# Patient Record
Sex: FEMALE | Race: WHITE | NOT HISPANIC OR LATINO | URBAN - METROPOLITAN AREA
[De-identification: names, ages, dates, MRNs, and addresses within clinical notes are randomized per-mention and may not be internally consistent; named-entity substitution may affect disease eponyms.]

---

## 2018-10-23 ENCOUNTER — OFFICE VISIT - HEALTHEAST (OUTPATIENT)
Dept: UROLOGY | Facility: CLINIC | Age: 63
End: 2018-10-23

## 2018-10-23 ENCOUNTER — HOSPITAL ENCOUNTER (OUTPATIENT)
Dept: CT IMAGING | Facility: CLINIC | Age: 63
Discharge: HOME | End: 2018-10-23
Attending: PHYSICIAN ASSISTANT

## 2018-10-23 DIAGNOSIS — N39.0 UTI (URINARY TRACT INFECTION): ICD-10-CM

## 2018-10-23 DIAGNOSIS — N20.0 CALCULUS OF KIDNEY: ICD-10-CM

## 2018-10-23 LAB
ALBUMIN UR-MCNC: ABNORMAL MG/DL
APPEARANCE UR: ABNORMAL
BILIRUB UR QL STRIP: NEGATIVE
COLOR UR AUTO: YELLOW
GLUCOSE UR STRIP-MCNC: NEGATIVE MG/DL
HGB UR QL STRIP: ABNORMAL
KETONES UR STRIP-MCNC: NEGATIVE MG/DL
LEUKOCYTE ESTERASE UR QL STRIP: ABNORMAL
NITRATE UR QL: NEGATIVE
PH UR STRIP: 7 [PH] (ref 5–8)
SP GR UR STRIP: 1.01 (ref 1–1.03)
UROBILINOGEN UR STRIP-ACNC: ABNORMAL

## 2018-10-23 ASSESSMENT — MIFFLIN-ST. JEOR: SCORE: 1431.32

## 2018-10-24 LAB — BACTERIA SPEC CULT: NORMAL

## 2021-06-02 VITALS — HEIGHT: 63 IN | BODY MASS INDEX: 35.44 KG/M2 | WEIGHT: 200 LBS

## 2021-06-21 NOTE — PROGRESS NOTES
Assessment/Plan:        Diagnoses and all orders for this visit:    Calculus of kidney  -     Urinalysis Macroscopic  -     Culture, Urine- Today  -     CT Abdomen Pelvis Without Oral Without IV Contrast; Future  -     oxyCODONE (ROXICODONE) 5 MG immediate release tablet; Take 1-2 tablets (5-10 mg total) by mouth every 4 (four) hours as needed for pain.  Dispense: 20 tablet; Refill: 0  -     ciprofloxacin HCl (CIPRO) 500 MG tablet; Take 1 tablet (500 mg total) by mouth 2 (two) times a day for 14 days.  Dispense: 14 tablet; Refill: 1  -     Patient Stated Goal: Know what to expect after surgery  -     [facility] Education    UTI (urinary tract infection)    Other orders  -     acetaminophen (TYLENOL) 325 MG tablet; Take 650 mg by mouth.  -     amLODIPine (NORVASC) 5 MG tablet; Take 5 mg by mouth.  -     aspirin 81 MG EC tablet; Take 81 mg by mouth.  -     atorvastatin (LIPITOR) 20 MG tablet; Take 20 mg by mouth.  -     calcium citrate-vitamin D (CITRACAL+D) 315-200 mg-unit per tablet; Take 1 tablet by mouth.  -     citalopram (CELEXA) 20 MG tablet; Take 20 mg by mouth.  -     docusate sodium (COLACE) 100 MG capsule; Take 100 mg by mouth.  -     hydroCHLOROthiazide (MICROZIDE) 12.5 mg capsule; Take 12.5 mg by mouth.  -     HYDROcodone-acetaminophen 5-325 mg per tablet; Take 1-2 tablets by mouth.  -     levothyroxine (SYNTHROID, LEVOTHROID) 112 MCG tablet; Take 112 mcg by mouth.  -     losartan (COZAAR) 25 MG tablet; Take 12.5 mg by mouth.  -     magnesium hydroxide (MILK OF MAG) 400 mg/5 mL Susp suspension; Take 30 mL by mouth.  -     ondansetron (ZOFRAN-ODT) 4 MG disintegrating tablet; Place 4 mg under the tongue.      Stone Management Plan  [facility] Stone Management 10/23/2018   Urinary Tract Infection Suspected Infection   Renal Colic Well controlled symptoms   Renal Failure No suspicion of renal failure   Current CT date 10/23/2018   Right sided stones? Yes   R Number of ureteral stones No ureteral stones   R Number of  kidney stones  > 10   R GSD of kidney stones 15 - 20   R Hydronephrosis Mild   R Stone Event New event   Diagnosis date 10/10/2018   Initial location of primary symptomatic stone Renal   Initial GSD of primary symptomatic stone 15   R Current Plan Clear   Clear rationale Associated treated infection   Left sided stones? No   L Stone Event No current event        Subjective:      HPI  Ms. Cassidy Max is a 63 y.o.  female presenting to the [facility] [place] referred by Dr. Sow for right nephrolithiasis with history of UTI.    She is a rapidly recurrent calcium oxalate and calcium phosphate (apatite) stone former who has required stone clearance procedures. She has not previously participated in stone risk evaluation. She has identified modifiable stone risks including:  type II diabetes. She has identified non-modifiable stone risks including:  multiple stones at presentation.     Cassidy had onset of stone disease in late 2016 with clearance of right renal stones, performed by Dr. Sow. Follow up postoperative imaging from 1/23/17 reported decrease in right lower pole renal stone burden with no obstruction. She completed a stone risk July 2017 which was unremarkable. She She was seen by PCP for acute pyelonephritis in early October with CT from 10/10/18 notable for gas filled right renal stones. Urine culture finalized with 3 species of e. Coli, pansensitive. She completed course of cefdinir 3 days ago. She has been referred for consideration of percutaneous right sided stone clearance.    She experiences intermittent, mild right flank pain. She works in housekeeping and notes movement will aggravate the pain. Pertinent negative current symptoms include:  fever, chills, nausea, vomiting, hematuria, urinary frequency and dysuria. She was on Forteo for history of fracture and low bone density, but this was recently discontinued with discovery of stone recurrence.    CT scan from today  is personally reviewed and compared to outside CT scan from 10/10/18. Similar appearance to multiple right renal stones dispersed within pelvis and lower pole. Previous gas collection within dominant stones has improved. There is a 15 mm, 13 mm within right renal pelvis. There are several small stones clustererd within lower pole. Extrarenal pelvis anatomy bilaterally. No left sided stones.     Significant labs from presentation include trace hematuria, mild pyuria, positive nitrite, moderate bacteria and e. coli, 3 species all pansensitive identified on urine culture 10/10/18.    PLAN    64 yo diabetic F with hx of calcium based stone disease and UTI's, previous surgical stone intervention. Active stone formation with multiple, dominant right renal stones with treated emphysematous e. Coli pyelitis. Intermittent, right flank pain currently well tolerated.    Will proceed with percutaneous nephrolithotomy in approximately two weeks. Risks and details were detailed of percutaneous nephrolithotomy including potential issues of urinary or systemic infection, inaccessible stone, incomplete stone clearance, pneumothorax, hydrothorax, hemothorax, blood loss anemia potentially requiring angio-embolization, adjacent organ injury, renal injury, multiple surgeries and stent related symptoms of urgency, frequency and hematuria. Patient verbalized understanding. Patient agrees with plan as discussed. Preoperative evaluation with primary care has been requested.    For symptom control, she was prescribed oxycodone. For documented infection, she was prescribed ciprofloxacin; will follow up pending culture and adjusts antibiotic if needed. Over the counter symptom control medications of ibuprofen, Dramamine and Tylenol were recommended.    Patient also seen and examined by LUIS Hernandez   Review of Systems  A 12 point comprehensive review of systems is negative except for HPI    Past Medical History:   Diagnosis Date      Kidney stone        No past surgical history on file.    Current Outpatient Prescriptions   Medication Sig Dispense Refill     acetaminophen (TYLENOL) 325 MG tablet Take 650 mg by mouth.       amLODIPine (NORVASC) 5 MG tablet Take 5 mg by mouth.       aspirin 81 MG EC tablet Take 81 mg by mouth.       atorvastatin (LIPITOR) 20 MG tablet Take 20 mg by mouth.       calcium citrate-vitamin D (CITRACAL+D) 315-200 mg-unit per tablet Take 1 tablet by mouth.       citalopram (CELEXA) 20 MG tablet Take 20 mg by mouth.       docusate sodium (COLACE) 100 MG capsule Take 100 mg by mouth.       hydroCHLOROthiazide (MICROZIDE) 12.5 mg capsule Take 12.5 mg by mouth.       HYDROcodone-acetaminophen 5-325 mg per tablet Take 1-2 tablets by mouth.       levothyroxine (SYNTHROID, LEVOTHROID) 112 MCG tablet Take 112 mcg by mouth.       losartan (COZAAR) 25 MG tablet Take 12.5 mg by mouth.       magnesium hydroxide (MILK OF MAG) 400 mg/5 mL Susp suspension Take 30 mL by mouth.       ondansetron (ZOFRAN-ODT) 4 MG disintegrating tablet Place 4 mg under the tongue.       No current facility-administered medications for this visit.        Allergies   Allergen Reactions     Lisinopril Cough       Social History     Social History     Marital status:      Spouse name: N/A     Number of children: N/A     Years of education: N/A     Occupational History     Not on file.     Social History Main Topics     Smoking status: Not on file     Smokeless tobacco: Not on file     Alcohol use Not on file     Drug use: Not on file     Sexual activity: Not on file     Other Topics Concern     Not on file     Social History Narrative     No narrative on file       No family history on file.    Objective:      Physical Exam  Vitals:    10/23/18 1103   BP: 112/76   Pulse: 82   Temp: 97.8  F (36.6  C)     General - well developed, well nourished, appropriate for age. Appears no distress at this time   Heart - regular rate and rhythm, no  murmur  Respiratory - normal effort, clear to auscultation, good air entry without adventitious noises  Abdomen - moderately obese soft, non-tender, no hepatosplenomegaly, no masses.   - no flank tenderness, no suprapubic tenderness, kidney and bladder non-palpable  MSK - normal spinal curvature. no spinal tenderness. normal gait. muscular strength intact.  Neurology - cranial nerves II-XII grossly intact, normal sensation, no unsteadiness  Skin - intact, no bruising, no gouty tophi  Psych - oriented to time, place, and person, normal mood and affect.    Labs  Urinalysis POC (Office):  Nitrite, UA   Date Value Ref Range Status   10/23/2018 Negative Negative Final       Lab Urinalysis:  Blood, UA   Date Value Ref Range Status   10/23/2018 Large (!) Negative Final     Nitrite, UA   Date Value Ref Range Status   10/23/2018 Negative Negative Final     Leukocytes, UA   Date Value Ref Range Status   10/23/2018 Moderate (!) Negative Final     pH, UA   Date Value Ref Range Status   10/23/2018 7.0 5.0 - 8.0 Final

## 2022-11-23 ENCOUNTER — TRANSFERRED RECORDS (OUTPATIENT)
Dept: HEALTH INFORMATION MANAGEMENT | Facility: CLINIC | Age: 67
End: 2022-11-23

## 2023-03-15 ENCOUNTER — TELEPHONE (OUTPATIENT)
Dept: RADIATION THERAPY | Facility: OUTPATIENT CENTER | Age: 68
End: 2023-03-15

## 2023-05-11 ENCOUNTER — TELEPHONE (OUTPATIENT)
Dept: RADIATION THERAPY | Facility: OUTPATIENT CENTER | Age: 68
End: 2023-05-11

## 2023-05-12 ENCOUNTER — TELEPHONE (OUTPATIENT)
Dept: RADIATION THERAPY | Facility: OUTPATIENT CENTER | Age: 68
End: 2023-05-12

## 2023-05-22 NOTE — PROGRESS NOTES
Dear Colleagues,  Today Cassidy Max was seen in consultation.  IDENTIFICATION: This is a 67 year old woman with right (UIQ) breast cancer, status post lumpectomy and SLNBx, revealing G3 IDCA, [id number] ER+/MA+/H2N- disease, followed by TC x4 completed on 5/10/23 now referred for adjuvant radiation therapy.   HISTORY OF PRESENT ILLNESS: Cassidy Max was in her usual state of health this past year.  On January 10, 2023 bilateral screening mammogram showed within the right breast 3 o'clock position focal asymmetry.  No suspicious finding in the left breast.  This was followed by right diagnostic mammogram and ultrasound completed on January 19, 2023 that confirmed irregular marginated hypoechoic solid mass which by ultrasound measuring 0.9 cm in greatest dimension.  There is no lymphadenopathy seen.  Same-day right breast ultrasound-guided biopsy at the 3 o'clock position was consistent with grade 2 IDC with no LVSI or DCIS.  It was ER/MA positive HER2/taty negative.  On February 3, 2023 Dr. Pineda took her to the OR and she had a right breast localized lumpectomy and sentinel lymph node biopsy.  Pathology revealed 1.3 cm of grade 3 IDC excised with negative margins with no LVSI or DCIS.  There were 0 out of 2 involved lymph nodes.  This gave her stage of [id number], ER/MA positive HER2/taty negative. Specimen radiograph was completed. Specimen radiograph demonstrates the targeted area including the lesion, the biopsy tissue marker and the radiofrequency localizer tag to be included in the specimen. Her postoperative course has been unenventful.  She was then seen by Dr. Kathi Oconnell who is her Medical Oncologist  Her Oncotype score returned back as 31 and subsequently received systemic therapy; docetaxel/cyclophsphamide + pegfilgrastim support from 3/8/23 to 5/10/23. She is to receive adjuvant anastrazole/endocrine therapy after XRT.    Since her surgery, she has continued to heal well and denies any  significant pain.  She has good ROM.  She denies any other new masses, skin changes, shortness of breath, chest or bony pain, or new neurologic symptoms. She is being seen here today for consideration of postoperative radiotherapy.  REVIEW OF SYSTEMS: As per HPI, a 14-point review of system is otherwise negative.  PAST RADIATION THERAPY:  Denies.  PAST CTD/PACEMAKER: Denies    Past Medical History:   Diagnosis Date     Cancer (H)      Depression      Hyperlipidemia LDL goal < 130      Hypothyroid 2002     Osteoporosis 2010     Vitamin D deficiency        Past Surgical History:   Procedure Laterality Date      SECTION       EXCISE GANGLION FOOT  1982    R foot     URETEROSCOPY       WRIST SURGERY      repair fracture with metal plates     ZZC RAD RESEC TONSIL/PILLARS  age 7       Family History   Problem Relation Age of Onset     Diabetes Mother      Osteoporosis Mother      Diabetes Sister 45        type 2       Social History     Tobacco Use     Smoking status: Former     Types: Cigarettes     Smokeless tobacco: Never     Tobacco comments:     Quit smoking    Vaping Use     Vaping status: Not on file   Substance Use Topics     Alcohol use: Yes     Comment: socially     Current Outpatient Medications   Medication     albuterol (PROAIR HFA/PROVENTIL HFA/VENTOLIN HFA) 108 (90 Base) MCG/ACT inhaler     atorvastatin (LIPITOR) 80 MG tablet     budesonide-formoterol (SYMBICORT) 160-4.5 MCG/ACT Inhaler     citalopram (CELEXA) 20 MG tablet     famotidine (PEPCID) 20 MG tablet     levothyroxine (SYNTHROID/LEVOTHROID) 125 MCG tablet     LORazepam (ATIVAN) 0.5 MG tablet     losartan (COZAAR) 50 MG tablet     metoprolol succinate ER (TOPROL XL) 50 MG 24 hr tablet     ORDER FOR DME     No current facility-administered medications for this visit.        Allergies   Allergen Reactions     Teriparatide Nausea and Vomiting     Abdomen, nausea, foot rash      Lisinopril Cough     PHYSICAL  EXAMINATION:  /79 (BP Location: Left arm, Cuff Size: Adult Large)   Pulse 64   Resp 16   Wt 97.7 kg (215 lb 6.4 oz)   SpO2 97%   BMI 38.16 kg/m    GENERAL Well-appearing woman in no acute distress.  HEENT Normocephalic, atraumatic.  Sclerae anicteric.  CVR  Regular rate and rhythm.  No murmurs, rubs, or gallops.  LUNGS Clear to auscultation bilaterally.  BREASTS Breasts are examined in the supine and upright position.  The breasts are symmetric.  The left breast is unremarkable, as there is no erythema, ulceration or suspicious nodularity within it.  Within the right inner breast and right axilla there are two well healed incisions.  Firmness of these incisions is not suspicious.  There is no suspicious erythema, ulceration or nodularity within the right breast.  There is no erythema, retraction, desquamation or discharge appreciated within the right or left nipple areolar complex.    LYMPH No supraclavicular, infraclavicular, or axillary lymphadenopathy appreciated bilaterally.  ABDOMEN Soft.    EXT  No clubbing or cyanosis or edema.    NEURO No focal deficits.  MSK  Good ROM.   SKIN  Warm and well perfused.    PSYCH  Alert and oriented x 3    ECOG PERFORMANCE STATUS: 0    IMPRESSION/PLAN: Cassidy Max is a 67 year old woman with right (UIQ) breast cancer, status post lumpectomy and SLNBx, revealing G3 IDCA, [id number] ER+/IA+/H2N- disease, followed by TC x4 completed on 5/10/23 now referred for adjuvant radiation therapy.   I recommend adjuvant XRT to improve local control and overall survival.  Plan is to treat the affected breast based on multiple randomized prospective data which show decrease risk of local recurrence by ~50% with the addition of XRT to BCS and the EBCTCG metaanalysis published in Lancet 2011 which shows that for every 4 recurrences avoided by year 10 one breast cancer death is avoided by year 15.  Given her HG disease she will also receive a tumor cavity boost based EORTC and  [employer] Boost Trials.  The risks, benefits, treatment rationale and regimen of radiation therapy to the breast were discussed in great detail today with the patient. Risks include but are not limited to skin erythema, desquamation, hyperpigmentation, breast and lymphedema, fibrosis, telengectasia, pneumonitis, cardiac toxicity, rib fracture and secondary malignancy. The patient consented to therapy and will have a CT simulation completed today.     Additional problem list to be addressed in the following manner:  1. Systemic/hormonal treatment : s/p TC x4 completed on 5/10/23.  Will f/u with Med Onc 1-2 weeks after XRT completed to discuss adjuvant endocrine therapy.   There was ample time for questions and all were answered to the patient's satisfaction. Thank you for allowing me to participate in the care of this pleasant patient. If you have any questions, please do not hesitate to contact my office.    Sincerely,  Jesus Marquez MD

## 2023-05-25 ENCOUNTER — APPOINTMENT (OUTPATIENT)
Dept: RADIATION THERAPY | Facility: OUTPATIENT CENTER | Age: 68
End: 2023-05-25
Payer: MEDICARE

## 2023-05-25 ENCOUNTER — OFFICE VISIT (OUTPATIENT)
Dept: RADIATION THERAPY | Facility: OUTPATIENT CENTER | Age: 68
End: 2023-05-25
Payer: MEDICARE

## 2023-05-25 VITALS
WEIGHT: 215.4 LBS | RESPIRATION RATE: 16 BRPM | DIASTOLIC BLOOD PRESSURE: 79 MMHG | SYSTOLIC BLOOD PRESSURE: 135 MMHG | HEART RATE: 64 BPM | OXYGEN SATURATION: 97 % | BODY MASS INDEX: 38.16 KG/M2

## 2023-05-25 DIAGNOSIS — C50.211 MALIGNANT NEOPLASM OF UPPER-INNER QUADRANT OF RIGHT BREAST IN FEMALE, ESTROGEN RECEPTOR POSITIVE (H): Primary | ICD-10-CM

## 2023-05-25 DIAGNOSIS — Z17.0 MALIGNANT NEOPLASM OF UPPER-INNER QUADRANT OF RIGHT BREAST IN FEMALE, ESTROGEN RECEPTOR POSITIVE (H): Primary | ICD-10-CM

## 2023-05-25 RX ORDER — LEVOTHYROXINE SODIUM 125 UG/1
125 TABLET ORAL DAILY
COMMUNITY
Start: 2023-02-09

## 2023-05-25 RX ORDER — FAMOTIDINE 20 MG/1
20 TABLET, FILM COATED ORAL 2 TIMES DAILY
COMMUNITY
Start: 2023-01-30

## 2023-05-25 RX ORDER — ALBUTEROL SULFATE 90 UG/1
2 AEROSOL, METERED RESPIRATORY (INHALATION) 4 TIMES DAILY PRN
COMMUNITY
Start: 2022-10-19

## 2023-05-25 RX ORDER — ATORVASTATIN CALCIUM 80 MG/1
80 TABLET, FILM COATED ORAL DAILY
COMMUNITY
Start: 2022-10-19

## 2023-05-25 RX ORDER — METOPROLOL SUCCINATE 50 MG/1
1 TABLET, EXTENDED RELEASE ORAL DAILY
COMMUNITY
Start: 2022-12-21

## 2023-05-25 RX ORDER — BUDESONIDE AND FORMOTEROL FUMARATE DIHYDRATE 160; 4.5 UG/1; UG/1
2 AEROSOL RESPIRATORY (INHALATION) 2 TIMES DAILY
COMMUNITY
Start: 2023-04-19

## 2023-05-25 RX ORDER — LORAZEPAM 0.5 MG/1
.25-.5 TABLET ORAL 2 TIMES DAILY PRN
COMMUNITY
Start: 2023-03-16

## 2023-05-25 RX ORDER — LOSARTAN POTASSIUM 50 MG/1
1 TABLET ORAL DAILY
COMMUNITY
Start: 2022-11-15

## 2023-05-25 RX ORDER — CITALOPRAM HYDROBROMIDE 20 MG/1
1 TABLET ORAL DAILY
COMMUNITY
Start: 2022-12-13

## 2023-05-25 ASSESSMENT — PAIN SCALES - GENERAL: PAINLEVEL: NO PAIN (0)

## 2023-05-25 NOTE — NURSING NOTE
"REASON FOR APPOINTMENT   Newly diagnosed right breast cancer, s/p lumpectomy and chemotherapy  Here to discuss radiation therapy. Working with Dr. Oconnell/[facility]    PERSONAL HISTORY OF CANCER   Previous Cancer ? no   Prior Radiation ? no   Prior Chemotherapy ? no   Prior Hormonal Therapy ? no     REFERRALS NEEDED  Not at this time    VITALS  /79 (BP Location: Left arm, Cuff Size: Adult Large)   Pulse 64   Resp 16   Wt 97.7 kg (215 lb 6.4 oz)   SpO2 97%   BMI 38.16 kg/m      PACEMAKER/IMPLANTED CARDIAC DEVICE : No    PAIN  Denies    PSYCHOSOCIAL  Marital Status:   Patient lives in [place], [place] with her  Russel.  Number of children: 2  Working status: Retired  Do you feel safe in your home? Yes    REVIEW OF SYSTEMS  Skin: healing right breast incision  Eyes: glasses  Ears/Nose/Throat: negative  Respiratory: occasional sob, wears C-pap/inhalers prn  Cardiovascular: irregular heart beat  Gastrointestinal: negative  Genitourinary: negative  Musculoskeletal: arthritis  Neurologic: negative  Psychiatric: negative  Hematologic/Lymphatic/Immunologic: negative  Endocrine: thyroid disorder    WOMEN ONLY  Any chance you may be pregnant: No, s/p hysterectomy 2018 due to \"benign growth\", no cancer dx  Age at first period: 15  Date of last period: age 52  Number of pregnancies: 4:  2 live/2 miscarriages   Births - #1 age 24  #2 age 27  Breast feeding: unsuccessful at breast feeding, tried for 2 weeks, \"the milk was bad\"  HRT - was on OTC meds for hot flashes 30 yr ago, unsure of the duration, never had any Rx HRT  Birth Control pills: Yes for 20 years, quit 38 years ago  Breast biopsys - Yes, on right breas,t 22-23 years ago, benign results/different area of the breast than now  No history of any breast/ovarian cancer in the family  No history of any connective tissue or autoimmune disorders      Radiation Oncology Patient Teaching    Person involved with teaching: Patient and   Patient asked Questions: " Yes  Patient was cooperative: Yes  Patient was receptive (willing to accept information given): Yes    Education Assessment  Comprehension ability: High  Knowledge level: Medium  Factors affecting teaching: None    Response To Teaching  Verbalizes understanding    GYN Only  Vaginal Dilator-given and educated: N/A    Do you have an advanced directive or living will? No, but will work on getting it done  Are you DNR/DNI? No

## 2023-05-25 NOTE — LETTER
5/25/2023         RE: Cassidy Max  [address] [facility] [address]        Dear Colleague,    Thank you for referring your patient, Cassidy Max, to the [facility]. Please see a copy of my visit note below.    Dear Colleagues,  Today Cassidy Max was seen in consultation.  IDENTIFICATION: This is a 67 year old woman with right (UIQ) breast cancer, status post lumpectomy and SLNBx, revealing G3 IDCA, mL6tW3C1 ER+/SC+/H2N- disease, followed by TC x4 completed on 5/10/23 now referred for adjuvant radiation therapy.   HISTORY OF PRESENT ILLNESS: Cassidy Max was in her usual state of health this past year.  On January 10, 2023 bilateral screening mammogram showed within the right breast 3 o'clock position focal asymmetry.  No suspicious finding in the left breast.  This was followed by right diagnostic mammogram and ultrasound completed on January 19, 2023 that confirmed irregular marginated hypoechoic solid mass which by ultrasound measuring 0.9 cm in greatest dimension.  There is no lymphadenopathy seen.  Same-day right breast ultrasound-guided biopsy at the 3 o'clock position was consistent with grade 2 IDC with no LVSI or DCIS.  It was ER/SC positive HER2/taty negative.  On February 3, 2023 Dr. Pineda took her to the OR and she had a right breast localized lumpectomy and sentinel lymph node biopsy.  Pathology revealed 1.3 cm of grade 3 IDC excised with negative margins with no LVSI or DCIS.  There were 0 out of 2 involved lymph nodes.  This gave her stage of [id number], ER/SC positive HER2/taty negative. Specimen radiograph was completed. Specimen radiograph demonstrates the targeted area including the lesion, the biopsy tissue marker and the radiofrequency localizer tag to be included in the specimen. Her postoperative course has been unenventful.  She was then seen by Dr. Kathi Oconnell who is her Medical Oncologist  Her Oncotype score returned back as 31 and  subsequently received systemic therapy; docetaxel/cyclophsphamide + pegfilgrastim support from 3/8/23 to 5/10/23. She is to receive adjuvant anastrazole/endocrine therapy after XRT.    Since her surgery, she has continued to heal well and denies any significant pain.  She has good ROM.  She denies any other new masses, skin changes, shortness of breath, chest or bony pain, or new neurologic symptoms. She is being seen here today for consideration of postoperative radiotherapy.  REVIEW OF SYSTEMS: As per HPI, a 14-point review of system is otherwise negative.  PAST RADIATION THERAPY:  Denies.  PAST CTD/PACEMAKER: Denies    Past Medical History:   Diagnosis Date    Cancer (H)     Depression     Hyperlipidemia LDL goal < 130     Hypothyroid 2002    Osteoporosis 2010    Vitamin D deficiency        Past Surgical History:   Procedure Laterality Date     SECTION      EXCISE GANGLION FOOT      R foot    URETEROSCOPY      WRIST SURGERY      repair fracture with metal plates    ZZC RAD RESEC TONSIL/PILLARS  age 7       Family History   Problem Relation Age of Onset    Diabetes Mother     Osteoporosis Mother     Diabetes Sister 45        type 2       Social History     Tobacco Use    Smoking status: Former     Types: Cigarettes    Smokeless tobacco: Never    Tobacco comments:     Quit smoking    Vaping Use    Vaping status: Not on file   Substance Use Topics    Alcohol use: Yes     Comment: socially     Current Outpatient Medications   Medication    albuterol (PROAIR HFA/PROVENTIL HFA/VENTOLIN HFA) 108 (90 Base) MCG/ACT inhaler    atorvastatin (LIPITOR) 80 MG tablet    budesonide-formoterol (SYMBICORT) 160-4.5 MCG/ACT Inhaler    citalopram (CELEXA) 20 MG tablet    famotidine (PEPCID) 20 MG tablet    levothyroxine (SYNTHROID/LEVOTHROID) 125 MCG tablet    LORazepam (ATIVAN) 0.5 MG tablet    losartan (COZAAR) 50 MG tablet    metoprolol succinate ER (TOPROL XL) 50 MG 24 hr tablet    ORDER FOR  DME     No current facility-administered medications for this visit.        Allergies   Allergen Reactions    Teriparatide Nausea and Vomiting     Abdomen, nausea, foot rash     Lisinopril Cough     PHYSICAL EXAMINATION:  /79 (BP Location: Left arm, Cuff Size: Adult Large)   Pulse 64   Resp 16   Wt 97.7 kg (215 lb 6.4 oz)   SpO2 97%   BMI 38.16 kg/m    GENERAL Well-appearing woman in no acute distress.  HEENT Normocephalic, atraumatic.  Sclerae anicteric.  CVR  Regular rate and rhythm.  No murmurs, rubs, or gallops.  LUNGS Clear to auscultation bilaterally.  BREASTS Breasts are examined in the supine and upright position.  The breasts are symmetric.  The left breast is unremarkable, as there is no erythema, ulceration or suspicious nodularity within it.  Within the right inner breast and right axilla there are two well healed incisions.  Firmness of these incisions is not suspicious.  There is no suspicious erythema, ulceration or nodularity within the right breast.  There is no erythema, retraction, desquamation or discharge appreciated within the right or left nipple areolar complex.    LYMPH No supraclavicular, infraclavicular, or axillary lymphadenopathy appreciated bilaterally.  ABDOMEN Soft.    EXT  No clubbing or cyanosis or edema.    NEURO No focal deficits.  MSK  Good ROM.   SKIN  Warm and well perfused.    PSYCH  Alert and oriented x 3    ECOG PERFORMANCE STATUS: 0    IMPRESSION/PLAN: Cassidy Max is a 67 year old woman with right (UIQ) breast cancer, status post lumpectomy and SLNBx, revealing G3 IDCA, [id number] ER+/LA+/H2N- disease, followed by TC x4 completed on 5/10/23 now referred for adjuvant radiation therapy.   I recommend adjuvant XRT to improve local control and overall survival.  Plan is to treat the affected breast based on multiple randomized prospective data which show decrease risk of local recurrence by ~50% with the addition of XRT to BCS and the EBCTCG metaanalysis  published in Lancet 2011 which shows that for every 4 recurrences avoided by year 10 one breast cancer death is avoided by year 15.  Given her HG disease she will also receive a tumor cavity boost based EORTC and [employer] Boost Trials.  The risks, benefits, treatment rationale and regimen of radiation therapy to the breast were discussed in great detail today with the patient. Risks include but are not limited to skin erythema, desquamation, hyperpigmentation, breast and lymphedema, fibrosis, telengectasia, pneumonitis, cardiac toxicity, rib fracture and secondary malignancy. The patient consented to therapy and will have a CT simulation completed today.     Additional problem list to be addressed in the following manner:  Systemic/hormonal treatment : s/p TC x4 completed on 5/10/23.  Will f/u with Med Onc 1-2 weeks after XRT completed to discuss adjuvant endocrine therapy.   There was ample time for questions and all were answered to the patient's satisfaction. Thank you for allowing me to participate in the care of this pleasant patient. If you have any questions, please do not hesitate to contact my office.    Sincerely,  Jesus Marquez MD

## 2023-06-07 ENCOUNTER — APPOINTMENT (OUTPATIENT)
Dept: RADIATION THERAPY | Facility: OUTPATIENT CENTER | Age: 68
End: 2023-06-07
Payer: MEDICARE

## 2023-06-08 ENCOUNTER — OFFICE VISIT (OUTPATIENT)
Dept: RADIATION THERAPY | Facility: OUTPATIENT CENTER | Age: 68
End: 2023-06-08
Payer: MEDICARE

## 2023-06-08 ENCOUNTER — APPOINTMENT (OUTPATIENT)
Dept: RADIATION THERAPY | Facility: OUTPATIENT CENTER | Age: 68
End: 2023-06-08
Payer: MEDICARE

## 2023-06-08 VITALS
DIASTOLIC BLOOD PRESSURE: 84 MMHG | RESPIRATION RATE: 18 BRPM | SYSTOLIC BLOOD PRESSURE: 136 MMHG | BODY MASS INDEX: 37.91 KG/M2 | HEART RATE: 67 BPM | OXYGEN SATURATION: 93 % | WEIGHT: 214 LBS

## 2023-06-08 DIAGNOSIS — C50.211 MALIGNANT NEOPLASM OF UPPER-INNER QUADRANT OF RIGHT BREAST IN FEMALE, ESTROGEN RECEPTOR POSITIVE (H): Primary | ICD-10-CM

## 2023-06-08 DIAGNOSIS — Z17.0 MALIGNANT NEOPLASM OF UPPER-INNER QUADRANT OF RIGHT BREAST IN FEMALE, ESTROGEN RECEPTOR POSITIVE (H): Primary | ICD-10-CM

## 2023-06-08 ASSESSMENT — PAIN SCALES - GENERAL: PAINLEVEL: NO PAIN (0)

## 2023-06-08 NOTE — LETTER
2023         RE: Cassidy Max  [address]  [facility] [address]        Dear Colleague,    Thank you for referring your patient, Cassidy Max, to the  [facility]. Please see a copy of my visit note below.    [facility]  SPECIALIZING IN BREAKTHROUGHS  Radiation Oncology    On Treatment Visit Note      Cassidy Max      Date: 2023   MRN: [id number]   : 1955  Diagnosis: IDC      Reason for Visit:  On Radiation Treatment Visit     Treatment Summary to Date  Treatment Site: R breast Current Dose: 532/5256 cGy Fractions:       Chemotherapy  Chemo concurrent with radx?: No    Subjective:     Early in treatment doing well with no complaints.    Nursing ROS:   Nutrition Alteration  Diet Type: Patient's Preference  Skin  Skin Reaction: 0 - No changes  Skin Note: aquaphor education        Cardiovascular  Respiratory effort: 2 - Mild dyspnea on exertion           Pain Assessment  0-10 Pain Scale: 0      Objective:   /84   Pulse 67   Resp 18   Wt 97.1 kg (214 lb)   SpO2 93%   BMI 37.91 kg/m    Gen: Appears well, in no acute distress  Skin: No erythema    Labs:  CBC RESULTS: No results for input(s): WBC, RBC, HGB, HCT, MCV, MCH, MCHC, RDW, PLT in the last 07071 hours.  ELECTROLYTES:  No results for input(s): NA, POTASSIUM, CHLORIDE, MICHEL, CO2, BUN, CR, GLC in the last 96684 hours.    Assessment:    Tolerating radiation therapy well.  All questions and concerns addressed.    Toxicities:  Fatigue: Grade 0: No toxicity  Dermatitis: Grade 0: No toxicity    Plan:   1. Continue current therapy.    2. Skin care is aquaphor 3-4 times per day.      Mosaiq chart and setup information reviewed  Ports checked    Medication Review  Med list reviewed with patient?: Yes    Educational Topic Discussed  Education Instructions: skin care education        Jesus Marquez MD    Please do not send letter to referring physician.          Again, thank you for  allowing me to participate in the care of your patient.        Sincerely,        TRAM Marquez MD

## 2023-06-09 ENCOUNTER — APPOINTMENT (OUTPATIENT)
Dept: RADIATION THERAPY | Facility: OUTPATIENT CENTER | Age: 68
End: 2023-06-09
Payer: MEDICARE

## 2023-06-09 NOTE — PROGRESS NOTES
[facility]  SPECIALIZING IN BREAKTHROUGHS  Radiation Oncology    On Treatment Visit Note      Cassidy Max      Date: 2023   MRN: [id number]   : 1955  Diagnosis: IDC      Reason for Visit:  On Radiation Treatment Visit     Treatment Summary to Date  Treatment Site: R breast Current Dose: 532/5256 cGy Fractions:       Chemotherapy  Chemo concurrent with radx?: No    Subjective:     Early in treatment doing well with no complaints.    Nursing ROS:   Nutrition Alteration  Diet Type: Patient's Preference  Skin  Skin Reaction: 0 - No changes  Skin Note: aquaphor education        Cardiovascular  Respiratory effort: 2 - Mild dyspnea on exertion           Pain Assessment  0-10 Pain Scale: 0      Objective:   /84   Pulse 67   Resp 18   Wt 97.1 kg (214 lb)   SpO2 93%   BMI 37.91 kg/m    Gen: Appears well, in no acute distress  Skin: No erythema    Labs:  CBC RESULTS: No results for input(s): WBC, RBC, HGB, HCT, MCV, MCH, MCHC, RDW, PLT in the last 19947 hours.  ELECTROLYTES:  No results for input(s): NA, POTASSIUM, CHLORIDE, MICHEL, CO2, BUN, CR, GLC in the last 00951 hours.    Assessment:    Tolerating radiation therapy well.  All questions and concerns addressed.    Toxicities:  Fatigue: Grade 0: No toxicity  Dermatitis: Grade 0: No toxicity    Plan:   1. Continue current therapy.    2. Skin care is aquaphor 3-4 times per day.      Mosaiq chart and setup information reviewed  Ports checked    Medication Review  Med list reviewed with patient?: Yes    Educational Topic Discussed  Education Instructions: skin care education        Jesus Marquez MD    Please do not send letter to referring physician.

## 2023-06-12 ENCOUNTER — APPOINTMENT (OUTPATIENT)
Dept: RADIATION THERAPY | Facility: OUTPATIENT CENTER | Age: 68
End: 2023-06-12
Payer: MEDICARE

## 2023-06-13 ENCOUNTER — APPOINTMENT (OUTPATIENT)
Dept: RADIATION THERAPY | Facility: OUTPATIENT CENTER | Age: 68
End: 2023-06-13
Payer: MEDICARE

## 2023-06-14 ENCOUNTER — APPOINTMENT (OUTPATIENT)
Dept: RADIATION THERAPY | Facility: OUTPATIENT CENTER | Age: 68
End: 2023-06-14
Payer: MEDICARE

## 2023-06-15 ENCOUNTER — APPOINTMENT (OUTPATIENT)
Dept: RADIATION THERAPY | Facility: OUTPATIENT CENTER | Age: 68
End: 2023-06-15
Payer: MEDICARE

## 2023-06-16 ENCOUNTER — APPOINTMENT (OUTPATIENT)
Dept: RADIATION THERAPY | Facility: OUTPATIENT CENTER | Age: 68
End: 2023-06-16
Payer: MEDICARE

## 2023-06-16 ENCOUNTER — OFFICE VISIT (OUTPATIENT)
Dept: RADIATION THERAPY | Facility: OUTPATIENT CENTER | Age: 68
End: 2023-06-16
Payer: MEDICARE

## 2023-06-16 VITALS
HEART RATE: 62 BPM | OXYGEN SATURATION: 96 % | BODY MASS INDEX: 35.92 KG/M2 | WEIGHT: 202.8 LBS | SYSTOLIC BLOOD PRESSURE: 133 MMHG | DIASTOLIC BLOOD PRESSURE: 80 MMHG | RESPIRATION RATE: 18 BRPM

## 2023-06-16 DIAGNOSIS — C50.211 MALIGNANT NEOPLASM OF UPPER-INNER QUADRANT OF RIGHT BREAST IN FEMALE, ESTROGEN RECEPTOR POSITIVE (H): Primary | ICD-10-CM

## 2023-06-16 DIAGNOSIS — Z17.0 MALIGNANT NEOPLASM OF UPPER-INNER QUADRANT OF RIGHT BREAST IN FEMALE, ESTROGEN RECEPTOR POSITIVE (H): Primary | ICD-10-CM

## 2023-06-16 ASSESSMENT — PAIN SCALES - GENERAL: PAINLEVEL: NO PAIN (0)

## 2023-06-16 NOTE — PROGRESS NOTES
[facility]YS[facility]ANS  SPECIALIZING IN BREAKTHROUGHS  Radiation Oncology    On Treatment Visit Note      Cassidy Max      Date: 2023   MRN: [id number]   : 1955  Diagnosis: IDC      Reason for Visit:  On Radiation Treatment Visit     Treatment Summary to Date  Treatment Site: R breast Current Dose: 2128/5256 cGy Fractions:       Chemotherapy  Chemo concurrent with radx?: No       Subjective:   Cassidy is tolerating radiation therapy well so far. She complains of fleeting pain in her right breast. She did receive IV fluids last week, and continues to feel dizzy with position change but this is improved. She has been using Aquaphor two to three times daily for skin care.     Nursing ROS:   Nutrition Alteration  Diet Type: Patient's Preference  Skin  Skin Reaction: 1 - Faint erythema or dry desquamation  Skin Note: using aquaphor 2-3x/day        Cardiovascular  Respiratory effort: 2 - Mild dyspnea on exertion           Pain Assessment  0-10 Pain Scale: 0      Objective:   /80   Pulse 62   Resp 18   Wt 92 kg (202 lb 12.8 oz)   SpO2 96%   BMI 35.92 kg/m    Gen: Appears well, in no acute distress  Skin: Mild diffuse erythema over treatment field    Labs:  CBC RESULTS: No results for input(s): WBC, RBC, HGB, HCT, MCV, MCH, MCHC, RDW, PLT in the last 99267 hours.  ELECTROLYTES:  No results for input(s): NA, POTASSIUM, CHLORIDE, MICHEL, CO2, BUN, CR, GLC in the last 12876 hours.    Assessment:    Tolerating radiation therapy well.  All questions and concerns addressed.    Toxicities:  Fatigue: Grade 1: Fatigue relieved by rest  Dermatitis: Grade 1: Faint erythema or dry desquamation    Plan:   1. Continue current therapy.    2. Reassured her that the occasional pain at the surgical site is quite common.   3. Continue with Aquaphor for skin care.       Expaniq chart and setup information reviewed  Ports checked    Medication Review  Med list reviewed with patient?: Yes              Charly Ba MD/PhD  [phone] clinic  Pager [phone]    Please do not send letter to referring physician.

## 2023-06-16 NOTE — LETTER
2023         RE: Cassidy Max  [address]  [facility] [address]        Dear Colleague,    Thank you for referring your patient, Cassidy Max, to the  [facility]. Please see a copy of my visit note below.    [facility]YS[facility]  SPECIALIZING IN BREAKTHROUGHS  Radiation Oncology    On Treatment Visit Note      Cassidy Max      Date: 2023   MRN: [id number]   : 1955  Diagnosis: IDC      Reason for Visit:  On Radiation Treatment Visit     Treatment Summary to Date  Treatment Site: R breast Current Dose: 2128/5256 cGy Fractions:       Chemotherapy  Chemo concurrent with radx?: No       Subjective:   Cassidy is tolerating radiation therapy well so far. She complains of fleeting pain in her right breast. She did receive IV fluids last week, and continues to feel dizzy with position change but this is improved. She has been using Aquaphor two to three times daily for skin care.     Nursing ROS:   Nutrition Alteration  Diet Type: Patient's Preference  Skin  Skin Reaction: 1 - Faint erythema or dry desquamation  Skin Note: using aquaphor 2-3x/day        Cardiovascular  Respiratory effort: 2 - Mild dyspnea on exertion           Pain Assessment  0-10 Pain Scale: 0      Objective:   /80   Pulse 62   Resp 18   Wt 92 kg (202 lb 12.8 oz)   SpO2 96%   BMI 35.92 kg/m    Gen: Appears well, in no acute distress  Skin: Mild diffuse erythema over treatment field    Labs:  CBC RESULTS: No results for input(s): WBC, RBC, HGB, HCT, MCV, MCH, MCHC, RDW, PLT in the last 70847 hours.  ELECTROLYTES:  No results for input(s): NA, POTASSIUM, CHLORIDE, MICHEL, CO2, BUN, CR, GLC in the last 65532 hours.    Assessment:    Tolerating radiation therapy well.  All questions and concerns addressed.    Toxicities:  Fatigue: Grade 1: Fatigue relieved by rest  Dermatitis: Grade 1: Faint erythema or dry desquamation    Plan:   1. Continue current therapy.     2. Reassured her that the occasional pain at the surgical site is quite common.   3. Continue with Aquaphor for skin care.       Mosaiq chart and setup information reviewed  Ports checked    Medication Review  Med list reviewed with patient?: Yes             Charly Ba MD/PhD  [phone] clinic  Pager [phone]    Please do not send letter to referring physician.        Again, thank you for allowing me to participate in the care of your patient.        Sincerely,        Charly Ba MD

## 2023-06-19 ENCOUNTER — APPOINTMENT (OUTPATIENT)
Dept: RADIATION THERAPY | Facility: OUTPATIENT CENTER | Age: 68
End: 2023-06-19
Payer: MEDICARE

## 2023-06-20 ENCOUNTER — APPOINTMENT (OUTPATIENT)
Dept: RADIATION THERAPY | Facility: OUTPATIENT CENTER | Age: 68
End: 2023-06-20
Payer: MEDICARE

## 2023-06-21 ENCOUNTER — APPOINTMENT (OUTPATIENT)
Dept: RADIATION THERAPY | Facility: OUTPATIENT CENTER | Age: 68
End: 2023-06-21
Payer: MEDICARE

## 2023-06-22 ENCOUNTER — APPOINTMENT (OUTPATIENT)
Dept: RADIATION THERAPY | Facility: OUTPATIENT CENTER | Age: 68
End: 2023-06-22
Payer: MEDICARE

## 2023-06-23 ENCOUNTER — OFFICE VISIT (OUTPATIENT)
Dept: RADIATION THERAPY | Facility: OUTPATIENT CENTER | Age: 68
End: 2023-06-23
Payer: MEDICARE

## 2023-06-23 ENCOUNTER — APPOINTMENT (OUTPATIENT)
Dept: RADIATION THERAPY | Facility: OUTPATIENT CENTER | Age: 68
End: 2023-06-23
Payer: MEDICARE

## 2023-06-23 VITALS
RESPIRATION RATE: 18 BRPM | OXYGEN SATURATION: 95 % | SYSTOLIC BLOOD PRESSURE: 132 MMHG | BODY MASS INDEX: 37.87 KG/M2 | DIASTOLIC BLOOD PRESSURE: 89 MMHG | WEIGHT: 213.8 LBS | HEART RATE: 78 BPM

## 2023-06-23 DIAGNOSIS — Z17.0 MALIGNANT NEOPLASM OF UPPER-INNER QUADRANT OF RIGHT BREAST IN FEMALE, ESTROGEN RECEPTOR POSITIVE (H): Primary | ICD-10-CM

## 2023-06-23 DIAGNOSIS — C50.211 MALIGNANT NEOPLASM OF UPPER-INNER QUADRANT OF RIGHT BREAST IN FEMALE, ESTROGEN RECEPTOR POSITIVE (H): Primary | ICD-10-CM

## 2023-06-23 ASSESSMENT — PAIN SCALES - GENERAL: PAINLEVEL: NO PAIN (1)

## 2023-06-23 NOTE — LETTER
2023         RE: Cassidy Max  [address]  [facility] [address]        Dear Colleague,    Thank you for referring your patient, Cassidy Max, to the  [facility]. Please see a copy of my visit note below.    [facility]  SPECIALIZING IN BREAKTHROUGHS  Radiation Oncology    On Treatment Visit Note      Cassidy Max      Date: 2023   MRN: [id number]   : 1955  Diagnosis: IDC      Reason for Visit:  On Radiation Treatment Visit     Treatment Summary to Date  Treatment Site: R breast Current Dose: 3458/5256 cGy Fractions:       Chemotherapy  Chemo concurrent with radx?: No    Subjective:     Has recurrent erythema in inframammary fold.  Otherwise has expected skin changes    Nursing ROS:   Nutrition Alteration  Diet Type: Patient's Preference  Skin  Skin Reaction: 1  Skin Note: using aquaphor 2-3x/day        Cardiovascular  Respiratory effort: 1 - Normal - without distress  Cardio/Resp Note: mild fatigue. pushing fluids/nutrition - feeling a little beter           Pain Assessment  0-10 Pain Scale: 1  Pain Note: slight tenderness due to open skin under breast      Objective:   /89   Pulse 78   Resp 18   Wt 97 kg (213 lb 12.8 oz)   SpO2 95%   BMI 37.87 kg/m    Gen: Appears well, in no acute distress  Skin: Minimal diffuse erythema over radiation treatment field, with hyperpigmented erythema in inframammary fold    Labs:  CBC RESULTS: No results for input(s): WBC, RBC, HGB, HCT, MCV, MCH, MCHC, RDW, PLT in the last 28765 hours.  ELECTROLYTES:  No results for input(s): NA, POTASSIUM, CHLORIDE, MICHEL, CO2, BUN, CR, GLC in the last 35681 hours.    Assessment:    Tolerating radiation therapy well.  All questions and concerns addressed.  Inframammary fold changes likely secondary to fungal infection.  Asked patient to keep this area dry as it declares itself.  If it continues to become more exaggerated or bothersome would recommend  starting nystatin powder.     Toxicities:  Dermatitis: Grade 1: Faint erythema or dry desquamation    Plan:   Continue current therapy.    Towel under breast and aquaphor to rest of breast 3-4 times per day      Mosaiq chart and setup information reviewed  Ports checked    Medication Review  Med list reviewed with patient?: Yes             Jesus Marquez MD    Please do not send letter to referring physician.

## 2023-06-23 NOTE — PROGRESS NOTES
[facility]YS[facility]  SPECIALIZING IN BREAKTHROUGHS  Radiation Oncology    On Treatment Visit Note      Cassidy Max      Date: 2023   MRN: [id number]   : 1955  Diagnosis: IDC      Reason for Visit:  On Radiation Treatment Visit     Treatment Summary to Date  Treatment Site: R breast Current Dose: 3458/5256 cGy Fractions:       Chemotherapy  Chemo concurrent with radx?: No    Subjective:     Has recurrent erythema in inframammary fold.  Otherwise has expected skin changes    Nursing ROS:   Nutrition Alteration  Diet Type: Patient's Preference  Skin  Skin Reaction: 1  Skin Note: using aquaphor 2-3x/day        Cardiovascular  Respiratory effort: 1 - Normal - without distress  Cardio/Resp Note: mild fatigue. pushing fluids/nutrition - feeling a little beter           Pain Assessment  0-10 Pain Scale: 1  Pain Note: slight tenderness due to open skin under breast      Objective:   /89   Pulse 78   Resp 18   Wt 97 kg (213 lb 12.8 oz)   SpO2 95%   BMI 37.87 kg/m    Gen: Appears well, in no acute distress  Skin: Minimal diffuse erythema over radiation treatment field, with hyperpigmented erythema in inframammary fold    Labs:  CBC RESULTS: No results for input(s): WBC, RBC, HGB, HCT, MCV, MCH, MCHC, RDW, PLT in the last 20069 hours.  ELECTROLYTES:  No results for input(s): NA, POTASSIUM, CHLORIDE, MICHEL, CO2, BUN, CR, GLC in the last 46428 hours.    Assessment:    Tolerating radiation therapy well.  All questions and concerns addressed.  Inframammary fold changes likely secondary to fungal infection.  Asked patient to keep this area dry as it declares itself.  If it continues to become more exaggerated or bothersome would recommend starting nystatin powder.     Toxicities:  Dermatitis: Grade 1: Faint erythema or dry desquamation    Plan:   1. Continue current therapy.    2. Towel under breast and aquaphor to rest of breast 3-4 times per day      Mosaiq chart and setup  information reviewed  Ports checked    Medication Review  Med list reviewed with patient?: Yes             Jesus Marquez MD    Please do not send letter to referring physician.

## 2023-06-26 ENCOUNTER — APPOINTMENT (OUTPATIENT)
Dept: RADIATION THERAPY | Facility: OUTPATIENT CENTER | Age: 68
End: 2023-06-26
Payer: MEDICARE

## 2023-06-26 DIAGNOSIS — L58.9 RADIATION DERMATITIS: Primary | ICD-10-CM

## 2023-06-26 RX ORDER — SILVER SULFADIAZINE 10 MG/G
CREAM TOPICAL 2 TIMES DAILY
Qty: 85 G | Refills: 1 | Status: SHIPPED | OUTPATIENT
Start: 2023-06-26

## 2023-06-27 ENCOUNTER — APPOINTMENT (OUTPATIENT)
Dept: RADIATION THERAPY | Facility: OUTPATIENT CENTER | Age: 68
End: 2023-06-27
Payer: MEDICARE

## 2023-06-28 ENCOUNTER — APPOINTMENT (OUTPATIENT)
Dept: RADIATION THERAPY | Facility: OUTPATIENT CENTER | Age: 68
End: 2023-06-28
Payer: MEDICARE

## 2023-06-29 ENCOUNTER — APPOINTMENT (OUTPATIENT)
Dept: RADIATION THERAPY | Facility: OUTPATIENT CENTER | Age: 68
End: 2023-06-29
Payer: MEDICARE

## 2023-06-30 ENCOUNTER — APPOINTMENT (OUTPATIENT)
Dept: RADIATION THERAPY | Facility: OUTPATIENT CENTER | Age: 68
End: 2023-06-30
Payer: MEDICARE

## 2023-06-30 ENCOUNTER — OFFICE VISIT (OUTPATIENT)
Dept: RADIATION THERAPY | Facility: OUTPATIENT CENTER | Age: 68
End: 2023-06-30
Payer: MEDICARE

## 2023-06-30 VITALS
BODY MASS INDEX: 37.66 KG/M2 | SYSTOLIC BLOOD PRESSURE: 123 MMHG | RESPIRATION RATE: 18 BRPM | WEIGHT: 212.6 LBS | DIASTOLIC BLOOD PRESSURE: 80 MMHG | HEART RATE: 66 BPM

## 2023-06-30 DIAGNOSIS — L58.9 RADIATION DERMATITIS: Primary | ICD-10-CM

## 2023-06-30 NOTE — LETTER
2023         RE: Cassidy Max  [address]  [facility] [address]        Dear Colleague,    Thank you for referring your patient, Cassidy Max, to the  [facility]. Please see a copy of my visit note below.    [facility]YSIC[facility]ANS  SPECIALIZING IN BREAKTHROUGHS  Radiation Oncology    On Treatment Visit Note      Cassidy Max      Date: 2023   MRN: [id number]   : 1955  Diagnosis: IDC      Reason for Visit:  On Radiation Treatment Visit     Treatment Summary to Date  Treatment Site: R breast Current Dose: 4756/5256 cGy Fractions:       Chemotherapy  Chemo concurrent with radx?: No    Subjective:     More skin reaction in inframammary fold.  Experiencing fatigue.    Nursing ROS:   Nutrition Alteration  Diet Type: Patient's Preference  Skin  Skin Reaction: 2 - Moderate to brisk erythema, patchy moist desquamation, mostly confined to skin folds and creases, moderate edema  Skin Note: using aquaphor 2-3x/day        Cardiovascular  Respiratory effort: 1 - Normal - without distress  Cardio/Resp Note: mild fatigue. pushing fluids/nutrition - feeling a little beter           Pain Assessment  0-10 Pain Scale: 3  Pain Note: slight tenderness due to open skin under breast      Objective:   /80   Pulse 66   Resp 18   Wt 96.4 kg (212 lb 9.6 oz)   BMI 37.66 kg/m    Gen: Appears well, in no acute distress  Skin: Minimal diffuse erythema over majority of treatment field, with break erythema and hyperpigmentation in inframammary fold.    Labs:  CBC RESULTS: No results for input(s): WBC, RBC, HGB, HCT, MCV, MCH, MCHC, RDW, PLT in the last 26100 hours.  ELECTROLYTES:  No results for input(s): NA, POTASSIUM, CHLORIDE, MICHEL, CO2, BUN, CR, GLC in the last 05992 hours.    Assessment:    Tolerating radiation therapy well.  All questions and concerns addressed.  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      Toxicities:  Fatigue: Grade 1: Fatigue relieved by rest  Pain: Grade 0: No toxicity  Dermatitis: Grade 2: Moderate to brisk erythema; patchy moist desquamation, mostly confined to skin folds and creases; moderate erythema    Plan:   1. Continue current therapy.    2. Keep inframammary fold dry with thin layer of SSD cream TID.  Symptoms will get worse over next 2 weeks before starting to improve.    3. Once radiation dermatitis has resolved in 4 weeks if erythema persists would recommend nystatin powder   4. Increase hydration to help with fatigue      Mosaiq chart and setup information reviewed  Ports checked    Medication Review  Med list reviewed with patient?: Yes    Educational Topic Discussed  Education Instructions: skin care        Jesus Marquez MD    Please do not send letter to referring physician.          Again, thank you for allowing me to participate in the care of your patient.        Sincerely,        TRAM Marquez MD

## 2023-06-30 NOTE — PROGRESS NOTES
[facility]  SPECIALIZING IN BREAKTHROUGHS  Radiation Oncology    On Treatment Visit Note      Cassidy Max      Date: 2023   MRN: [id number]   : 1955  Diagnosis: IDC      Reason for Visit:  On Radiation Treatment Visit     Treatment Summary to Date  Treatment Site: R breast Current Dose: 4756/5256 cGy Fractions:       Chemotherapy  Chemo concurrent with radx?: No    Subjective:     More skin reaction in inframammary fold.  Experiencing fatigue.    Nursing ROS:   Nutrition Alteration  Diet Type: Patient's Preference  Skin  Skin Reaction: 2 - Moderate to brisk erythema, patchy moist desquamation, mostly confined to skin folds and creases, moderate edema  Skin Note: using aquaphor 2-3x/day        Cardiovascular  Respiratory effort: 1 - Normal - without distress  Cardio/Resp Note: mild fatigue. pushing fluids/nutrition - feeling a little beter           Pain Assessment  0-10 Pain Scale: 3  Pain Note: slight tenderness due to open skin under breast      Objective:   /80   Pulse 66   Resp 18   Wt 96.4 kg (212 lb 9.6 oz)   BMI 37.66 kg/m    Gen: Appears well, in no acute distress  Skin: Minimal diffuse erythema over majority of treatment field, with break erythema and hyperpigmentation in inframammary fold.    Labs:  CBC RESULTS: No results for input(s): WBC, RBC, HGB, HCT, MCV, MCH, MCHC, RDW, PLT in the last 93614 hours.  ELECTROLYTES:  No results for input(s): NA, POTASSIUM, CHLORIDE, MICHEL, CO2, BUN, CR, GLC in the last 23546 hours.    Assessment:    Tolerating radiation therapy well.  All questions and concerns addressed.  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      Toxicities:  Fatigue: Grade 1: Fatigue relieved by rest  Pain: Grade 0: No toxicity  Dermatitis: Grade 2: Moderate to brisk erythema; patchy moist desquamation, mostly confined to skin folds and creases; moderate  erythema    Plan:   1. Continue current therapy.    2. Keep inframammary fold dry with thin layer of SSD cream TID.  Symptoms will get worse over next 2 weeks before starting to improve.    3. Once radiation dermatitis has resolved in 4 weeks if erythema persists would recommend nystatin powder   4. Increase hydration to help with fatigue      Mosaiq chart and setup information reviewed  Ports checked    Medication Review  Med list reviewed with patient?: Yes    Educational Topic Discussed  Education Instructions: skin care        Jesus Marquez MD    Please do not send letter to referring physician.

## 2023-07-03 ENCOUNTER — APPOINTMENT (OUTPATIENT)
Dept: RADIATION THERAPY | Facility: OUTPATIENT CENTER | Age: 68
End: 2023-07-03
Payer: MEDICARE

## 2023-07-05 ENCOUNTER — APPOINTMENT (OUTPATIENT)
Dept: RADIATION THERAPY | Facility: OUTPATIENT CENTER | Age: 68
End: 2023-07-05
Payer: MEDICARE

## 2023-08-18 ENCOUNTER — OFFICE VISIT (OUTPATIENT)
Dept: RADIATION THERAPY | Facility: OUTPATIENT CENTER | Age: 68
End: 2023-08-18
Payer: MEDICARE

## 2023-08-18 VITALS
HEART RATE: 65 BPM | OXYGEN SATURATION: 99 % | SYSTOLIC BLOOD PRESSURE: 148 MMHG | RESPIRATION RATE: 12 BRPM | DIASTOLIC BLOOD PRESSURE: 73 MMHG | BODY MASS INDEX: 39.01 KG/M2 | HEIGHT: 62 IN | WEIGHT: 212 LBS

## 2023-08-18 DIAGNOSIS — C50.211 MALIGNANT NEOPLASM OF UPPER-INNER QUADRANT OF RIGHT BREAST IN FEMALE, ESTROGEN RECEPTOR POSITIVE (H): Primary | ICD-10-CM

## 2023-08-18 DIAGNOSIS — Z17.0 MALIGNANT NEOPLASM OF UPPER-INNER QUADRANT OF RIGHT BREAST IN FEMALE, ESTROGEN RECEPTOR POSITIVE (H): Primary | ICD-10-CM

## 2023-08-18 PROBLEM — E66.812 CLASS 2 SEVERE OBESITY DUE TO EXCESS CALORIES WITH SERIOUS COMORBIDITY IN ADULT (H): Status: ACTIVE | Noted: 2023-08-18

## 2023-08-18 PROBLEM — E66.01 CLASS 2 SEVERE OBESITY DUE TO EXCESS CALORIES WITH SERIOUS COMORBIDITY IN ADULT (H): Status: ACTIVE | Noted: 2023-08-18

## 2023-08-18 NOTE — PROGRESS NOTES
Department of Radiation Oncology  Radiation Therapy Center  [facility] [place] [address]  [phone]       Radiation Oncology Follow-up Visit  2023    Cassidy Max  MRN: [id number]   : 1955     Attending: Jesus Marquez MD  General Surgeon: Karla Pineda MD    DISEASE TREATED: Right IDC, G3, IDC, [id number] ER+/MN+/H2N-      RADIATION THERAPY DELIVERED: Adjuvant WBRT 266 cGy x 16 fractions with a lumpectomy cavity boost of 250 cGy x 4 fractions totaling 5,256 cGy in 20 fractions from 23 to 23.    Oncologic History  Cassidy Max was in her usual state of health this past year.  On January 10, 2023 bilateral screening mammogram showed within the right breast 3 o'clock position focal asymmetry.  No suspicious finding in the left breast.  This was followed by right diagnostic mammogram and ultrasound completed on 2023 that confirmed irregular marginated hypoechoic solid mass which by ultrasound measuring 0.9 cm in greatest dimension.  There is no lymphadenopathy seen.  Same-day right breast ultrasound-guided biopsy at the 3 o'clock position was consistent with grade 2 IDC with no LVSI or DCIS.  It was ER/MN positive HER2/taty negative.  On February 3, 2023 Dr. Pineda took her to the OR and she had a right breast localized lumpectomy and sentinel lymph node biopsy.  Pathology revealed 1.3 cm of grade 3 IDC excised with negative margins with no LVSI or DCIS.  There were 0 out of 2 involved lymph nodes.  This gave her stage of [id number], ER/MN positive HER2/taty negative. Specimen radiograph was completed. Specimen radiograph demonstrates the targeted area including the lesion, the biopsy tissue marker and the radiofrequency localizer tag to be included in the specimen. Her postoperative course has been unenventful.  She was then seen by Dr. Kathi Oconnell who is her Medical Oncologist  Her Oncotype score returned back as 31 and subsequently received  "systemic therapy; docetaxel/cyclophsphamide + pegfilgrastim support from 3/8/23 to 5/10/23. She is to receive adjuvant anastrazole/endocrine therapy after XRT.      INTERVAL SINCE COMPLETION OF RADIATION THERAPY:   One month 2 weeks    Subjective: today she is accompanied by her . She reports fatigue, wants to help  around the house but it tired by 3:00. Has some bilateral hand pain since starting AI one month ago. Had a bit of arthritis prior. But is manageable. Some hot flashes. manageable    PHYSICAL EXAM:  BP (!) 148/73 (BP Location: Left arm, Patient Position: Sitting)   Pulse 65   Resp 12   Ht 1.575 m (5' 2\")   Wt 96.2 kg (212 lb)   SpO2 99%   BMI 38.78 kg/m     Gen: Alert, in NAD  Eyes: PERRL, EOMI, sclera anicteric  HENT     Head: NC/AT     Ears: No external auricular lesions     Nose/sinus: No rhinorrhea or epistaxis     Oral Cavity/Oropharynx: MMM  Neck: Supple, full ROM, no LAD  Pulm: No wheezing, stridor or respiratory distress  Right breast UIQ surgical scar healing well, right axillary scar healing well. Right breast slightly hyperpigmented secondary to RT. Bilaterally there are no masses, nodules or nipple discharge.   CV: Well-perfused, no cyanosis, no pedal edema  Abdominal: Soft, nontender, nondistended, no hepatomegaly  Back: No step-offs or pain to palpation along the thoracolumbar spine, no CVA tenderness    Musculoskeletal: Normal bulk and tone   Skin: Normal color and turgor  Neurologic: A/Ox3, CN II-XII intact  Psychiatric: Appropriate mood and affect    LABS AND IMAGING:  Reviewed.    IMPRESSION:   Ms. Max is a 67 year old female here for one month follow up after completing RT.  In February 2023 she underwent right breast lumpectomy with sentinel lymph node biopsy showing invasive ductal carcinoma measuring 1.3 cm, Carrillo grade 3 with negative final margins, ER+, [place]+, Her2-, Ki67 18%. 0/2 SLNs positive.  OncotypeDx RS = 31, associated with 19% risk of distant " recurrence at 9 years and > 15% absolute benefit from chemotherapy Adjuvant chemotherapy was recommended and administered 03/08-05/10/23: docetaxel/cyclophosphamide x 4 cycles, pegfilgrastim support. From 06/07/23 to 7/5/2023 completed adjuvant radiation. On AI. Doing well. Tolerated RT well. Skin is healing nicely. Clinically GIORGI.     PLAN:   Going to [place] for 2 week in February 2024. See me in March in person      SUNDAY Thomas  Department of Radiation Oncology  [facility]

## 2023-08-18 NOTE — LETTER
2023         RE: Cassidy Max  [address] [facility] [address]        Dear Colleague,    Thank you for referring your patient, Cassidy Max, to the [facility] RADIATION THERAPY CLINIC. Please see a copy of my visit note below.       Department of Radiation Oncology  Radiation Therapy Center  [facility]  [facility] [place] [address]  [phone]       Radiation Oncology Follow-up Visit  2023    Cassidy Max  MRN: [id number]   : 1955     Attending: Jesus Marquez MD  General Surgeon: Karla Pineda MD    DISEASE TREATED: Right IDC, G3, IDC, [id number] ER+/VT+/H2N-      RADIATION THERAPY DELIVERED: Adjuvant WBRT 266 cGy x 16 fractions with a lumpectomy cavity boost of 250 cGy x 4 fractions totaling 5,256 cGy in 20 fractions from 23 to 23.    Oncologic History  Cassidy Max was in her usual state of health this past year.  On January 10, 2023 bilateral screening mammogram showed within the right breast 3 o'clock position focal asymmetry.  No suspicious finding in the left breast.  This was followed by right diagnostic mammogram and ultrasound completed on 2023 that confirmed irregular marginated hypoechoic solid mass which by ultrasound measuring 0.9 cm in greatest dimension.  There is no lymphadenopathy seen.  Same-day right breast ultrasound-guided biopsy at the 3 o'clock position was consistent with grade 2 IDC with no LVSI or DCIS.  It was ER/VT positive HER2/taty negative.  On February 3, 2023 Dr. Pineda took her to the OR and she had a right breast localized lumpectomy and sentinel lymph node biopsy.  Pathology revealed 1.3 cm of grade 3 IDC excised with negative margins with no LVSI or DCIS.  There were 0 out of 2 involved lymph nodes.  This gave her stage of [id number], ER/VT positive HER2/taty negative. Specimen radiograph was completed. Specimen radiograph demonstrates the targeted area including the lesion, the biopsy tissue marker and the  "radiofrequency localizer tag to be included in the specimen. Her postoperative course has been unenventful.  She was then seen by Dr. Kathi Oconnell who is her Medical Oncologist  Her Oncotype score returned back as 31 and subsequently received systemic therapy; docetaxel/cyclophsphamide + pegfilgrastim support from 3/8/23 to 5/10/23. She is to receive adjuvant anastrazole/endocrine therapy after XRT.      INTERVAL SINCE COMPLETION OF RADIATION THERAPY:   One month 2 weeks    Subjective: today she is accompanied by her . She reports fatigue, wants to help  around the house but it tired by 3:00. Has some bilateral hand pain since starting AI one month ago. Had a bit of arthritis prior. But is manageable. Some hot flashes. manageable    PHYSICAL EXAM:  BP (!) 148/73 (BP Location: Left arm, Patient Position: Sitting)   Pulse 65   Resp 12   Ht 1.575 m (5' 2\")   Wt 96.2 kg (212 lb)   SpO2 99%   BMI 38.78 kg/m     Gen: Alert, in NAD  Eyes: PERRL, EOMI, sclera anicteric  HENT     Head: NC/AT     Ears: No external auricular lesions     Nose/sinus: No rhinorrhea or epistaxis     Oral Cavity/Oropharynx: MMM  Neck: Supple, full ROM, no LAD  Pulm: No wheezing, stridor or respiratory distress  Right breast UIQ surgical scar healing well, right axillary scar healing well. Right breast slightly hyperpigmented secondary to RT. Bilaterally there are no masses, nodules or nipple discharge.   CV: Well-perfused, no cyanosis, no pedal edema  Abdominal: Soft, nontender, nondistended, no hepatomegaly  Back: No step-offs or pain to palpation along the thoracolumbar spine, no CVA tenderness    Musculoskeletal: Normal bulk and tone   Skin: Normal color and turgor  Neurologic: A/Ox3, CN II-XII intact  Psychiatric: Appropriate mood and affect    LABS AND IMAGING:  Reviewed.    IMPRESSION:   Ms. Max is a 67 year old female here for one month follow up after completing RT.  In February 2023 she underwent right breast " lumpectomy with sentinel lymph node biopsy showing invasive ductal carcinoma measuring 1.3 cm, [place] grade 3 with negative final margins, ER+, CA+, Her2-, Ki67 18%. 0/2 SLNs positive.  OncotypeDx RS = 31, associated with 19% risk of distant recurrence at 9 years and > 15% absolute benefit from chemotherapy Adjuvant chemotherapy was recommended and administered 03/08-05/10/23: docetaxel/cyclophosphamide x 4 cycles, pegfilgrastim support. From 06/07/23 to 7/5/2023 completed adjuvant radiation. On AI. Doing well. Tolerated RT well. Skin is healing nicely. Clinically GIORGI.     PLAN:   Going to [place] for 2 week in February 2024. See me in March in person      SUNDAY Thomas  Department of Radiation Oncology  [facility]          Again, thank you for allowing me to participate in the care of your patient.        Sincerely,        Margoth Chavez PA-C

## 2024-03-15 ENCOUNTER — OFFICE VISIT (OUTPATIENT)
Dept: RADIATION THERAPY | Facility: OUTPATIENT CENTER | Age: 69
End: 2024-03-15
Payer: MEDICARE

## 2024-03-15 DIAGNOSIS — Z17.0 MALIGNANT NEOPLASM OF UPPER-INNER QUADRANT OF RIGHT BREAST IN FEMALE, ESTROGEN RECEPTOR POSITIVE (H): Primary | ICD-10-CM

## 2024-03-15 DIAGNOSIS — C50.211 MALIGNANT NEOPLASM OF UPPER-INNER QUADRANT OF RIGHT BREAST IN FEMALE, ESTROGEN RECEPTOR POSITIVE (H): Primary | ICD-10-CM

## 2024-03-15 NOTE — LETTER
3/15/2024         RE: Cassidy Max  [address] [facility] [address]        Dear Colleague,    Thank you for referring your patient, Cassidy Max, to the [facility] RADIATION THERAPY CLINIC. Please see a copy of my visit note below.       Department of Radiation Oncology  Radiation Therapy Center  [facility] [place] [address]  [phone]       Radiation Oncology Follow-up Visit  3/15/24    Cassidy Max  MRN: [id number]   : 1955     Attending: Minda Marquez MD  General Surgeon: Karla Pineda MD  Medical Oncologist: Kathi Oconnell MD    DISEASE TREATED: Right IDC, G3, IDC, [id number] ER+/CT+/H2N-      RADIATION THERAPY DELIVERED: Adjuvant WBRT 266 cGy x 16 fractions with a lumpectomy cavity boost of 250 cGy x 4 fractions totaling 5,256 cGy in 20 fractions from 23 to 23.    Oncologic History  Cassidy Max was in her usual state of health this past year.  On January 10, 2023 bilateral screening mammogram showed within the right breast 3 o'clock position focal asymmetry.  No suspicious finding in the left breast.  This was followed by right diagnostic mammogram and ultrasound completed on 2023 that confirmed irregular marginated hypoechoic solid mass which by ultrasound measuring 0.9 cm in greatest dimension.  There is no lymphadenopathy seen.  Same-day right breast ultrasound-guided biopsy at the 3 o'clock position was consistent with grade 2 IDC with no LVSI or DCIS.  It was ER/CT positive HER2/taty negative.  On February 3, 2023 Dr. Pineda took her to the OR and she had a right breast localized lumpectomy and sentinel lymph node biopsy.  Pathology revealed 1.3 cm of grade 3 IDC excised with negative margins with no LVSI or DCIS.  There were 0 out of 2 involved lymph nodes.  This gave her stage of [id number], ER/CT positive HER2/taty negative. Specimen radiograph was completed. Specimen radiograph demonstrates the targeted area including the  lesion, the biopsy tissue marker and the radiofrequency localizer tag to be included in the specimen. Her postoperative course has been unenventful.  She was then seen by Dr. Kathi Oconnell who is her Medical Oncologist  Her Oncotype score returned back as 31 and subsequently received systemic therapy; docetaxel/cyclophsphamide + pegfilgrastim support from 3/8/23 to 5/10/23. She is to receive adjuvant anastrazole/endocrine therapy after XRT.      INTERVAL SINCE COMPLETION OF RADIATION THERAPY:   8 mo    Subjective: today she is accompanied by her . She reports fatigue, wants to help  around the house but it tired by 3:00. Has some bilateral hand pain since starting AI one month ago. Had a bit of arthritis prior. But is manageable. Some hot flashes. Manageable. Daughter has breast  cancer diagnosed age 45.     PHYSICAL EXAM:  There were no vitals taken for this visit.   Gen: Alert, in NAD  Eyes: PERRL, EOMI, sclera anicteric  HENT     Head: NC/AT     Ears: No external auricular lesions     Nose/sinus: No rhinorrhea or epistaxis     Oral Cavity/Oropharynx: MMM  Neck: Supple, full ROM, no LAD  Pulm: No wheezing, stridor or respiratory distress  Right breast UIQ surgical scar healing well, right axillary scar healing well. Right breast slightly hyperpigmented secondary to RT. Bilaterally there are no masses, nodules or nipple discharge.   CV: Well-perfused, no cyanosis, no pedal edema  Abdominal: Soft, nontender, nondistended, no hepatomegaly  Back: No step-offs or pain to palpation along the thoracolumbar spine, no CVA tenderness    Musculoskeletal: Normal bulk and tone   Skin: Normal color and turgor  Neurologic: A/Ox3, CN II-XII intact  Psychiatric: Appropriate mood and affect    LABS AND IMAGING:  Reviewed. Mammogram 1/17/24    IMPRESSION:   Ms. Max is a 68 year old female here for one month follow up after completing RT.  In February 2023 she underwent right breast lumpectomy with sentinel lymph  node biopsy showing invasive ductal carcinoma measuring 1.3 cm, Carrillo grade 3 with negative final margins, ER+, [place]+, Her2-, Ki67 18%. 0/2 SLNs positive.  OncotypeDx RS = 31, associated with 19% risk of distant recurrence at 9 years and > 15% absolute benefit from chemotherapy Adjuvant chemotherapy was recommended and administered 03/08-05/10/23: docetaxel/cyclophosphamide x 4 cycles, pegfilgrastim support. From 06/07/23 to 7/5/2023 completed adjuvant radiation. On AI. Doing well. Tolerated RT well. Skin is healing nicely. Clinically GIORGI.     PLAN:   See me in one year        SUNDAY Thomas  Department of Radiation Oncology  [facility]          Again, thank you for allowing me to participate in the care of your patient.        Sincerely,        Margoth Chavez PA-C

## 2024-03-15 NOTE — PROGRESS NOTES
Department of Radiation Oncology  Radiation Therapy Center  [facility] [place] [address]  [phone]       Radiation Oncology Follow-up Visit  3/15/24    Cassidy Max  MRN: [id number]   : 1955     Attending: Minda Marquez MD  General Surgeon: Karla Pineda MD  Medical Oncologist: Kathi Oconnell MD    DISEASE TREATED: Right IDC, G3, IDC, [id number] ER+/IA+/H2N-      RADIATION THERAPY DELIVERED: Adjuvant WBRT 266 cGy x 16 fractions with a lumpectomy cavity boost of 250 cGy x 4 fractions totaling 5,256 cGy in 20 fractions from 23 to 23.    Oncologic History  Cassidy Max was in her usual state of health this past year.  On January 10, 2023 bilateral screening mammogram showed within the right breast 3 o'clock position focal asymmetry.  No suspicious finding in the left breast.  This was followed by right diagnostic mammogram and ultrasound completed on 2023 that confirmed irregular marginated hypoechoic solid mass which by ultrasound measuring 0.9 cm in greatest dimension.  There is no lymphadenopathy seen.  Same-day right breast ultrasound-guided biopsy at the 3 o'clock position was consistent with grade 2 IDC with no LVSI or DCIS.  It was ER/IA positive HER2/taty negative.  On February 3, 2023 Dr. Pineda took her to the OR and she had a right breast localized lumpectomy and sentinel lymph node biopsy.  Pathology revealed 1.3 cm of grade 3 IDC excised with negative margins with no LVSI or DCIS.  There were 0 out of 2 involved lymph nodes.  This gave her stage of [id number], ER/IA positive HER2/taty negative. Specimen radiograph was completed. Specimen radiograph demonstrates the targeted area including the lesion, the biopsy tissue marker and the radiofrequency localizer tag to be included in the specimen. Her postoperative course has been unenventful.  She was then seen by Dr. Kathi Oconnell who is her Medical Oncologist  Her Oncotype score returned  back as 31 and subsequently received systemic therapy; docetaxel/cyclophsphamide + pegfilgrastim support from 3/8/23 to 5/10/23. She is to receive adjuvant anastrazole/endocrine therapy after XRT.      INTERVAL SINCE COMPLETION OF RADIATION THERAPY:   8 mo    Subjective: today she is accompanied by her . She reports fatigue, wants to help  around the house but it tired by 3:00. Has some bilateral hand pain since starting AI one month ago. Had a bit of arthritis prior. But is manageable. Some hot flashes. Manageable. Daughter has breast  cancer diagnosed age 45.     PHYSICAL EXAM:  There were no vitals taken for this visit.   Gen: Alert, in NAD  Eyes: PERRL, EOMI, sclera anicteric  HENT     Head: NC/AT     Ears: No external auricular lesions     Nose/sinus: No rhinorrhea or epistaxis     Oral Cavity/Oropharynx: MMM  Neck: Supple, full ROM, no LAD  Pulm: No wheezing, stridor or respiratory distress  Right breast UIQ surgical scar healing well, right axillary scar healing well. Right breast slightly hyperpigmented secondary to RT. Bilaterally there are no masses, nodules or nipple discharge.   CV: Well-perfused, no cyanosis, no pedal edema  Abdominal: Soft, nontender, nondistended, no hepatomegaly  Back: No step-offs or pain to palpation along the thoracolumbar spine, no CVA tenderness    Musculoskeletal: Normal bulk and tone   Skin: Normal color and turgor  Neurologic: A/Ox3, CN II-XII intact  Psychiatric: Appropriate mood and affect    LABS AND IMAGING:  Reviewed. Mammogram 1/17/24    IMPRESSION:   Ms. Max is a 68 year old female here for one month follow up after completing RT.  In February 2023 she underwent right breast lumpectomy with sentinel lymph node biopsy showing invasive ductal carcinoma measuring 1.3 cm, [place] grade 3 with negative final margins, ER+, [place]+, Her2-, Ki67 18%. 0/2 SLNs positive.  OncotypeDx RS = 31, associated with 19% risk of distant recurrence at 9 years and > 15%  absolute benefit from chemotherapy Adjuvant chemotherapy was recommended and administered 03/08-05/10/23: docetaxel/cyclophosphamide x 4 cycles, pegfilgrastim support. From 06/07/23 to 7/5/2023 completed adjuvant radiation. On AI. Doing well. Tolerated RT well. Skin is healing nicely. Clinically GIORGI.     PLAN:   See me in one year        SUNDAY Thomas  Department of Radiation Oncology  [facility]